# Patient Record
Sex: FEMALE | URBAN - METROPOLITAN AREA
[De-identification: names, ages, dates, MRNs, and addresses within clinical notes are randomized per-mention and may not be internally consistent; named-entity substitution may affect disease eponyms.]

---

## 2021-05-10 ENCOUNTER — SKIN CANCER EXAM (OUTPATIENT)
Dept: URBAN - METROPOLITAN AREA CLINIC 31 | Facility: CLINIC | Age: 41
Setting detail: DERMATOLOGY
End: 2021-05-10

## 2021-05-10 DIAGNOSIS — L24.9 IRRITANT CONTACT DERMATITIS, UNSPECIFIED CAUSE: ICD-10-CM

## 2021-05-10 DIAGNOSIS — L82.1 OTHER SEBORRHEIC KERATOSIS: ICD-10-CM

## 2021-05-10 DIAGNOSIS — D22.5 MELANOCYTIC NEVI OF TRUNK: ICD-10-CM

## 2021-05-10 PROBLEM — L60.9 NAIL DISORDER, UNSPECIFIED: Status: ACTIVE | Noted: 2021-05-10

## 2021-05-10 PROBLEM — L60.9 NAIL DISORDER, UNSPECIFIED: Status: RESOLVED | Noted: 2021-05-10

## 2021-05-10 PROBLEM — L57.0 ACTINIC KERATOSIS: Status: RESOLVED | Noted: 2021-05-10

## 2021-05-10 PROCEDURE — 17000 DESTRUCT PREMALG LESION: CPT

## 2021-05-10 PROCEDURE — 99214 OFFICE O/P EST MOD 30 MIN: CPT

## 2021-07-14 ENCOUNTER — FOLLOW UP (OUTPATIENT)
Dept: URBAN - METROPOLITAN AREA CLINIC 32 | Facility: CLINIC | Age: 41
Setting detail: DERMATOLOGY
End: 2021-07-14

## 2021-07-14 DIAGNOSIS — Z41.9 ENCOUNTER FOR PROCEDURE FOR PURPOSES OTHER THAN REMEDYING HEALTH STATE, UNSPECIFIED: ICD-10-CM

## 2021-07-14 PROBLEM — L608 703.8: Status: ACTIVE | Noted: 2021-07-14

## 2021-07-14 PROBLEM — L603 703.8: Status: ACTIVE | Noted: 2021-07-14

## 2021-07-14 PROBLEM — L601 703.8: Status: ACTIVE | Noted: 2021-07-14

## 2021-07-14 PROBLEM — L602 703.8: Status: ACTIVE | Noted: 2021-07-14

## 2021-07-14 PROBLEM — L604 703.8: Status: ACTIVE | Noted: 2021-07-14

## 2021-07-14 PROBLEM — L659 704.00: Status: ACTIVE | Noted: 2021-07-14

## 2021-07-14 PROCEDURE — 99212 OFFICE O/P EST SF 10 MIN: CPT

## 2022-01-11 ENCOUNTER — WEB ENCOUNTER (OUTPATIENT)
Dept: URBAN - METROPOLITAN AREA CLINIC 50 | Facility: CLINIC | Age: 42
End: 2022-01-11

## 2022-01-11 ENCOUNTER — OFFICE VISIT (OUTPATIENT)
Dept: URBAN - METROPOLITAN AREA CLINIC 50 | Facility: CLINIC | Age: 42
End: 2022-01-11
Payer: COMMERCIAL

## 2022-01-11 VITALS
HEIGHT: 64 IN | TEMPERATURE: 97.9 F | BODY MASS INDEX: 19.84 KG/M2 | WEIGHT: 116.2 LBS | SYSTOLIC BLOOD PRESSURE: 102 MMHG | HEART RATE: 85 BPM | DIASTOLIC BLOOD PRESSURE: 74 MMHG

## 2022-01-11 DIAGNOSIS — R63.4 WEIGHT LOSS: ICD-10-CM

## 2022-01-11 DIAGNOSIS — E61.1 IRON DEFICIENCY: ICD-10-CM

## 2022-01-11 PROCEDURE — 99244 OFF/OP CNSLTJ NEW/EST MOD 40: CPT | Performed by: INTERNAL MEDICINE

## 2022-01-11 PROCEDURE — 99204 OFFICE O/P NEW MOD 45 MIN: CPT | Performed by: INTERNAL MEDICINE

## 2022-01-11 RX ORDER — SODIUM PICOSULFATE, MAGNESIUM OXIDE, AND ANHYDROUS CITRIC ACID 10; 3.5; 12 MG/160ML; G/160ML; G/160ML
160 ML LIQUID ORAL
Qty: 320 MILLILITER | Refills: 0 | OUTPATIENT
Start: 2022-01-11 | End: 2022-01-12

## 2022-01-11 NOTE — HPI-TODAY'S VISIT:
referred by Dr. 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 parisi for all this weight loss/hair loss blamed it on stress, put her on SSRI labs all nromal ferritin 16 (low), iron was a little low was told to take iron, take a SSRI. 5 lbs weight loss. was eating 5000 kcal binges, does not know why she does not gain weight. was not eliminating, bm q3 days is not vomiting anxiety is very high feel frail, freezing, hair loss started iron. did not start ssri. bingeing continued. since then 5 lbs down again. 10 lb weigh tloss not eliminating. grad school--started binging.  has seen a therapist about this, has some OCD around eating went back to pcp and had labs, tsh was normal 3 BM/week.  menstrual cycle, is more regular. in december went to get a wellness kit labs  ferritin 14 (LLN 16) heavy cycle bloopd on toilet tissue uncle w/ celiac never had KIRSTEN used to donate blood

## 2022-02-16 ENCOUNTER — CLAIMS CREATED FROM THE CLAIM WINDOW (OUTPATIENT)
Dept: URBAN - METROPOLITAN AREA SURGERY CENTER 18 | Facility: SURGERY CENTER | Age: 42
End: 2022-02-16

## 2022-02-16 ENCOUNTER — CLAIMS CREATED FROM THE CLAIM WINDOW (OUTPATIENT)
Dept: URBAN - METROPOLITAN AREA SURGERY CENTER 18 | Facility: SURGERY CENTER | Age: 42
End: 2022-02-16
Payer: COMMERCIAL

## 2022-02-16 ENCOUNTER — CLAIMS CREATED FROM THE CLAIM WINDOW (OUTPATIENT)
Dept: URBAN - METROPOLITAN AREA CLINIC 4 | Facility: CLINIC | Age: 42
End: 2022-02-16
Payer: COMMERCIAL

## 2022-02-16 DIAGNOSIS — K31.7 BENIGN GASTRIC POLYP: ICD-10-CM

## 2022-02-16 DIAGNOSIS — D50.9 ANEMIA: ICD-10-CM

## 2022-02-16 DIAGNOSIS — K21.9 GASTRO-ESOPHAGEAL REFLUX DISEASE WITHOUT ESOPHAGITIS: ICD-10-CM

## 2022-02-16 DIAGNOSIS — K31.7 POLYP OF STOMACH AND DUODENUM: ICD-10-CM

## 2022-02-16 DIAGNOSIS — K31.89 FOCAL FOVEOLAR HYPERPLASIA: ICD-10-CM

## 2022-02-16 DIAGNOSIS — R63.4 ABNORMAL INTENTIONAL WEIGHT LOSS: ICD-10-CM

## 2022-02-16 DIAGNOSIS — K21.9 ACID REFLUX: ICD-10-CM

## 2022-02-16 DIAGNOSIS — K31.89 ACQUIRED DEFORMITY OF DUODENUM: ICD-10-CM

## 2022-02-16 PROCEDURE — 43239 EGD BIOPSY SINGLE/MULTIPLE: CPT | Performed by: INTERNAL MEDICINE

## 2022-02-16 PROCEDURE — 88305 TISSUE EXAM BY PATHOLOGIST: CPT | Performed by: PATHOLOGY

## 2022-02-16 PROCEDURE — 88342 IMHCHEM/IMCYTCHM 1ST ANTB: CPT | Performed by: PATHOLOGY

## 2022-02-16 PROCEDURE — 88312 SPECIAL STAINS GROUP 1: CPT | Performed by: PATHOLOGY

## 2022-02-16 PROCEDURE — G8907 PT DOC NO EVENTS ON DISCHARG: HCPCS | Performed by: INTERNAL MEDICINE

## 2022-02-16 PROCEDURE — 45378 DIAGNOSTIC COLONOSCOPY: CPT | Performed by: INTERNAL MEDICINE

## 2022-03-03 ENCOUNTER — WEB ENCOUNTER (OUTPATIENT)
Dept: URBAN - METROPOLITAN AREA CLINIC 50 | Facility: CLINIC | Age: 42
End: 2022-03-03

## 2022-03-10 ENCOUNTER — WEB ENCOUNTER (OUTPATIENT)
Dept: URBAN - METROPOLITAN AREA CLINIC 50 | Facility: CLINIC | Age: 42
End: 2022-03-10

## 2022-03-20 ENCOUNTER — WEB ENCOUNTER (OUTPATIENT)
Dept: URBAN - METROPOLITAN AREA CLINIC 50 | Facility: CLINIC | Age: 42
End: 2022-03-20

## 2022-03-24 ENCOUNTER — WEB ENCOUNTER (OUTPATIENT)
Dept: URBAN - METROPOLITAN AREA CLINIC 50 | Facility: CLINIC | Age: 42
End: 2022-03-24

## 2022-03-25 ENCOUNTER — WEB ENCOUNTER (OUTPATIENT)
Dept: URBAN - METROPOLITAN AREA CLINIC 50 | Facility: CLINIC | Age: 42
End: 2022-03-25

## 2022-05-11 ENCOUNTER — RX ONLY (RX ONLY)
Age: 42
End: 2022-05-11

## 2022-05-11 ENCOUNTER — SKIN CANCER EXAM (OUTPATIENT)
Dept: URBAN - METROPOLITAN AREA CLINIC 32 | Facility: CLINIC | Age: 42
Setting detail: DERMATOLOGY
End: 2022-05-11

## 2022-05-11 DIAGNOSIS — L29.8 OTHER PRURITUS: ICD-10-CM

## 2022-05-11 PROBLEM — L659 704.00: Status: ACTIVE | Noted: 2022-05-11

## 2022-05-11 PROBLEM — B35.1 TINEA UNGUIUM: Status: ACTIVE | Noted: 2022-05-11

## 2022-05-11 PROBLEM — B35.1 TINEA UNGUIUM: Status: RESOLVED | Noted: 2022-05-11

## 2022-05-11 PROCEDURE — 99213 OFFICE O/P EST LOW 20 MIN: CPT

## 2022-05-11 RX ORDER — TERBINAFINE HYDROCHLORIDE 250 MG/1
1 TABLET TABLET ORAL ONCE A DAY
Qty: 30 | Refills: 2
Start: 2022-05-11

## 2022-11-09 ENCOUNTER — TELEPHONE ENCOUNTER (OUTPATIENT)
Dept: URBAN - METROPOLITAN AREA CLINIC 50 | Facility: CLINIC | Age: 42
End: 2022-11-09

## 2022-12-06 ENCOUNTER — TELEPHONE ENCOUNTER (OUTPATIENT)
Dept: URBAN - METROPOLITAN AREA CLINIC 98 | Facility: CLINIC | Age: 42
End: 2022-12-06

## 2022-12-07 ENCOUNTER — CLAIMS CREATED FROM THE CLAIM WINDOW (OUTPATIENT)
Dept: URBAN - METROPOLITAN AREA TELEHEALTH 2 | Facility: TELEHEALTH | Age: 42
End: 2022-12-07
Payer: COMMERCIAL

## 2022-12-07 ENCOUNTER — LAB OUTSIDE AN ENCOUNTER (OUTPATIENT)
Dept: URBAN - METROPOLITAN AREA TELEHEALTH 2 | Facility: TELEHEALTH | Age: 42
End: 2022-12-07

## 2022-12-07 ENCOUNTER — DASHBOARD ENCOUNTERS (OUTPATIENT)
Age: 42
End: 2022-12-07

## 2022-12-07 VITALS — WEIGHT: 110 LBS | HEIGHT: 64 IN | BODY MASS INDEX: 18.78 KG/M2

## 2022-12-07 DIAGNOSIS — R63.4 UNINTENTIONAL WEIGHT LOSS: ICD-10-CM

## 2022-12-07 DIAGNOSIS — D50.8 ACQUIRED IRON DEFICIENCY ANEMIA DUE TO DECREASED ABSORPTION: ICD-10-CM

## 2022-12-07 DIAGNOSIS — E61.1 IRON DEFICIENCY: ICD-10-CM

## 2022-12-07 PROCEDURE — 99212 OFFICE O/P EST SF 10 MIN: CPT | Performed by: INTERNAL MEDICINE

## 2022-12-07 PROCEDURE — 99213 OFFICE O/P EST LOW 20 MIN: CPT | Performed by: INTERNAL MEDICINE

## 2022-12-07 PROCEDURE — 99212 OFFICE O/P EST SF 10 MIN: CPT

## 2022-12-07 RX ORDER — BUPROPION HYDROCHLORIDE 150 MG/1
TABLET, EXTENDED RELEASE ORAL
Qty: 90 EACH | Status: ACTIVE | COMMUNITY

## 2022-12-07 NOTE — HPI-TODAY'S VISIT:
Patient is a 42 year old female who presents via telehealth for continued unintetnional weight lossl  referred by Dr. 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 Grew up with Yoan Stearnsjohny. 5 yo, 5 yo kids She endorses her job exposes her to "people dying" which comes with stressors  She is a vegeterian  coping mechanism with carbs- reports she will binge eat which she poses concern for in setting of these eating habits she is not gaining weight  father w/ graves disease  There is associated hair loss which has been evaluated by primary care dr. parisi Initially patient blamed it on stress and was place don Wellbutrin  Labs stable per patient  ferritin 16 (low), iron was a little low- was told to take iron Weight with initial consultation in 1/2022 found to be 116. Currently 110lbs despite efforts to actively increase caloric intake  Denies vomiting episodes  Does acknowledge anxiety is very high has seen a therapist about this, has some OCD around eating went back to pcp and had labs, tsh was normal 3 BM/week. EGD completed 2/16/2022 which revealed normal-appearing duodenum.  Bilious gastric fluid.  Gastritis and nonobstructing Schatzki's ring.  A single gastric polyp was noted.   Colonoscopy also completed 2/16/2022 which revealed normal-appearing colon.  Nonbleeding internal hemorrhoids and no specimens were collected.   Pathology report revealed no significant abnormality of the duodenum and stomach biopsy.  Fundic gland polyp.  Reflux type changes in the GE junction with no evidence of Contreras's esophagus or eosinophilic esophagitis.

## 2022-12-15 ENCOUNTER — TELEPHONE ENCOUNTER (OUTPATIENT)
Dept: URBAN - METROPOLITAN AREA CLINIC 96 | Facility: CLINIC | Age: 42
End: 2022-12-15

## 2022-12-16 ENCOUNTER — WEB ENCOUNTER (OUTPATIENT)
Dept: URBAN - METROPOLITAN AREA CLINIC 50 | Facility: CLINIC | Age: 42
End: 2022-12-16

## 2022-12-20 ENCOUNTER — WEB ENCOUNTER (OUTPATIENT)
Dept: URBAN - METROPOLITAN AREA CLINIC 50 | Facility: CLINIC | Age: 42
End: 2022-12-20

## 2022-12-23 ENCOUNTER — WEB ENCOUNTER (OUTPATIENT)
Dept: URBAN - METROPOLITAN AREA CLINIC 50 | Facility: CLINIC | Age: 42
End: 2022-12-23